# Patient Record
Sex: FEMALE | Race: WHITE | NOT HISPANIC OR LATINO
[De-identification: names, ages, dates, MRNs, and addresses within clinical notes are randomized per-mention and may not be internally consistent; named-entity substitution may affect disease eponyms.]

---

## 2017-07-20 PROBLEM — Z00.00 ENCOUNTER FOR PREVENTIVE HEALTH EXAMINATION: Status: ACTIVE | Noted: 2017-07-20

## 2017-07-27 ENCOUNTER — APPOINTMENT (OUTPATIENT)
Dept: ORTHOPEDIC SURGERY | Facility: CLINIC | Age: 27
End: 2017-07-27
Payer: COMMERCIAL

## 2017-07-27 VITALS — WEIGHT: 158 LBS | HEIGHT: 65 IN | BODY MASS INDEX: 26.33 KG/M2

## 2017-07-27 DIAGNOSIS — M89.9 DISORDER OF BONE, UNSPECIFIED: ICD-10-CM

## 2017-07-27 DIAGNOSIS — Z56.0 UNEMPLOYMENT, UNSPECIFIED: ICD-10-CM

## 2017-07-27 DIAGNOSIS — Z78.9 OTHER SPECIFIED HEALTH STATUS: ICD-10-CM

## 2017-07-27 PROCEDURE — 99204 OFFICE O/P NEW MOD 45 MIN: CPT

## 2017-07-27 SDOH — ECONOMIC STABILITY - INCOME SECURITY: UNEMPLOYMENT, UNSPECIFIED: Z56.0

## 2017-08-11 ENCOUNTER — APPOINTMENT (OUTPATIENT)
Dept: ORTHOPEDIC SURGERY | Facility: HOSPITAL | Age: 27
End: 2017-08-11

## 2017-08-18 ENCOUNTER — OUTPATIENT (OUTPATIENT)
Dept: OUTPATIENT SERVICES | Facility: HOSPITAL | Age: 27
LOS: 1 days | End: 2017-08-18

## 2017-08-18 VITALS
SYSTOLIC BLOOD PRESSURE: 118 MMHG | WEIGHT: 160.06 LBS | RESPIRATION RATE: 14 BRPM | DIASTOLIC BLOOD PRESSURE: 70 MMHG | HEART RATE: 60 BPM | HEIGHT: 64 IN | TEMPERATURE: 98 F

## 2017-08-18 DIAGNOSIS — Z98.890 OTHER SPECIFIED POSTPROCEDURAL STATES: Chronic | ICD-10-CM

## 2017-08-18 DIAGNOSIS — M25.9 JOINT DISORDER, UNSPECIFIED: ICD-10-CM

## 2017-08-18 DIAGNOSIS — M89.9 DISORDER OF BONE, UNSPECIFIED: ICD-10-CM

## 2017-08-18 DIAGNOSIS — Z01.818 ENCOUNTER FOR OTHER PREPROCEDURAL EXAMINATION: ICD-10-CM

## 2017-08-18 DIAGNOSIS — Z90.79 ACQUIRED ABSENCE OF OTHER GENITAL ORGAN(S): Chronic | ICD-10-CM

## 2017-08-18 LAB
HCG SERPL-ACNC: < 5 MIU/ML — SIGNIFICANT CHANGE UP
HCT VFR BLD CALC: 38.8 % — SIGNIFICANT CHANGE UP (ref 34.5–45)
HGB BLD-MCNC: 12.7 G/DL — SIGNIFICANT CHANGE UP (ref 11.5–15.5)
MCHC RBC-ENTMCNC: 28.5 PG — SIGNIFICANT CHANGE UP (ref 27–34)
MCHC RBC-ENTMCNC: 32.7 % — SIGNIFICANT CHANGE UP (ref 32–36)
MCV RBC AUTO: 87 FL — SIGNIFICANT CHANGE UP (ref 80–100)
NRBC # FLD: 0 — SIGNIFICANT CHANGE UP
PLATELET # BLD AUTO: 316 K/UL — SIGNIFICANT CHANGE UP (ref 150–400)
PMV BLD: 9.5 FL — SIGNIFICANT CHANGE UP (ref 7–13)
RBC # BLD: 4.46 M/UL — SIGNIFICANT CHANGE UP (ref 3.8–5.2)
RBC # FLD: 13.1 % — SIGNIFICANT CHANGE UP (ref 10.3–14.5)
WBC # BLD: 7.42 K/UL — SIGNIFICANT CHANGE UP (ref 3.8–10.5)
WBC # FLD AUTO: 7.42 K/UL — SIGNIFICANT CHANGE UP (ref 3.8–10.5)

## 2017-08-18 RX ORDER — SODIUM CHLORIDE 9 MG/ML
3 INJECTION INTRAMUSCULAR; INTRAVENOUS; SUBCUTANEOUS EVERY 8 HOURS
Qty: 0 | Refills: 0 | Status: DISCONTINUED | OUTPATIENT
Start: 2017-08-30 | End: 2017-09-14

## 2017-08-18 RX ORDER — SODIUM CHLORIDE 9 MG/ML
1000 INJECTION, SOLUTION INTRAVENOUS
Qty: 0 | Refills: 0 | Status: DISCONTINUED | OUTPATIENT
Start: 2017-08-30 | End: 2017-09-14

## 2017-08-18 NOTE — H&P PST ADULT - LYMPHATIC
anterior cervical L/supraclavicular L/supraclavicular R/posterior cervical L/anterior cervical R/posterior cervical R

## 2017-08-18 NOTE — H&P PST ADULT - GENERAL COMMENTS
treated with amoxicillin for tooth infection x 21 days, course completed. treated with amoxicillin for tooth infection x 21 days 2 months ago, course completed.

## 2017-08-18 NOTE — H&P PST ADULT - PROBLEM SELECTOR PLAN 1
Scheduled for Left Biopsy Possible Curettage Graft Cement of left Distal Radius Lesion on 8/30/2017.   Pre op instructions given, pt verbalized understanding   Chlorhexidine wash and GI prophylaxis provided

## 2017-08-18 NOTE — H&P PST ADULT - HISTORY OF PRESENT ILLNESS
25 y/o female presents to [facility] for preoperative evaluation with diagnosis of disorder of bone and joint. H/O Left wrist pain x 2 months. Pt states MRI of left wrist was performed which noted "possible osteosarcoma". She is scheduled for Left Biopsy Possible Curettage Graft Cement of left Distal Radius Lesion on 8/30/2017. She reports intermittent stabbing pain and decreased ROM. 27 y/o female presents to [facility] for preoperative evaluation with diagnosis of disorder of bone and joint. H/O Left wrist pain x 2 months. Pt states MRI of left wrist was performed which noted "possible osteosarcoma". She is scheduled for Left Biopsy Possible Curettage Graft Cement of left Distal Radius Lesion on 8/30/2017. She reports intermittent stabbing pain and decreased ROM of the left wrsit.

## 2017-08-18 NOTE — H&P PST ADULT - RS GEN PE MLT RESP DETAILS PC
no chest wall tenderness/breath sounds equal/clear to auscultation bilaterally/airway patent/respirations non-labored/no wheezes/good air movement

## 2017-08-18 NOTE — H&P PST ADULT - NEGATIVE GENERAL GENITOURINARY SYMPTOMS
no renal colic/no flank pain R/no bladder infections/no dysuria/no hematuria/no incontinence/no flank pain L

## 2017-08-18 NOTE — H&P PST ADULT - NEGATIVE ENMT SYMPTOMS
no gum bleeding/no throat pain/no sinus symptoms/no recurrent cold sores/no post-nasal discharge/no dysphagia/no hearing difficulty/no nasal congestion

## 2017-08-18 NOTE — H&P PST ADULT - NEGATIVE CARDIOVASCULAR SYMPTOMS
no chest pain/no orthopnea/no peripheral edema/no paroxysmal nocturnal dyspnea/no palpitations/no dyspnea on exertion/no claudication

## 2017-08-18 NOTE — H&P PST ADULT - NSANTHOSAYNRD_GEN_A_CORE
No. GIRISH screening performed.  STOP BANG Legend: 0-2 = LOW Risk; 3-4 = INTERMEDIATE Risk; 5-8 = HIGH Risk

## 2017-08-18 NOTE — H&P PST ADULT - PMH
Anxiety and depression    Arthritis  left knee  Endometriosis    Herniated lumbar intervertebral disc    PCOS (polycystic ovarian syndrome) Anxiety and depression    Arthritis  left knee  Disorder of bone    Endometriosis    Herniated lumbar intervertebral disc    Joint disorder    PCOS (polycystic ovarian syndrome)

## 2017-08-18 NOTE — H&P PST ADULT - ATTENDING COMMENTS
as written above   for biopsy, possible resection of left distal radius lesion  Risks, benefits and alternatives discussed with patient.  Norm Varma MD  Musculoskeletal Oncology  [phone]

## 2017-08-30 ENCOUNTER — APPOINTMENT (OUTPATIENT)
Dept: ORTHOPEDIC SURGERY | Facility: HOSPITAL | Age: 27
End: 2017-08-30

## 2017-08-30 ENCOUNTER — OUTPATIENT (OUTPATIENT)
Dept: OUTPATIENT SERVICES | Facility: HOSPITAL | Age: 27
LOS: 1 days | Discharge: HOME | End: 2017-08-30
Payer: MEDICAID

## 2017-08-30 ENCOUNTER — RESULT REVIEW (OUTPATIENT)
Age: 27
End: 2017-08-30

## 2017-08-30 VITALS
OXYGEN SATURATION: 98 % | HEART RATE: 76 BPM | SYSTOLIC BLOOD PRESSURE: 101 MMHG | DIASTOLIC BLOOD PRESSURE: 70 MMHG | RESPIRATION RATE: 16 BRPM

## 2017-08-30 VITALS
HEART RATE: 86 BPM | RESPIRATION RATE: 16 BRPM | OXYGEN SATURATION: 100 % | WEIGHT: 160.06 LBS | HEIGHT: 64 IN | SYSTOLIC BLOOD PRESSURE: 104 MMHG | TEMPERATURE: 98 F | DIASTOLIC BLOOD PRESSURE: 70 MMHG

## 2017-08-30 DIAGNOSIS — M25.9 JOINT DISORDER, UNSPECIFIED: ICD-10-CM

## 2017-08-30 DIAGNOSIS — Z90.79 ACQUIRED ABSENCE OF OTHER GENITAL ORGAN(S): Chronic | ICD-10-CM

## 2017-08-30 DIAGNOSIS — Z98.890 OTHER SPECIFIED POSTPROCEDURAL STATES: Chronic | ICD-10-CM

## 2017-08-30 LAB
GRAM STN WND: SIGNIFICANT CHANGE UP
HCG UR QL: NEGATIVE — SIGNIFICANT CHANGE UP
SPECIMEN SOURCE: SIGNIFICANT CHANGE UP

## 2017-08-30 PROCEDURE — 25126 REMOVE/GRAFT FOREARM LESION: CPT

## 2017-08-30 PROCEDURE — 76000 FLUOROSCOPY <1 HR PHYS/QHP: CPT | Mod: 26

## 2017-08-30 PROCEDURE — 88309 TISSUE EXAM BY PATHOLOGIST: CPT | Mod: 26

## 2017-08-30 PROCEDURE — 25035 INC DP BONE CRTX F/ARM&/WRST: CPT

## 2017-08-30 PROCEDURE — 88305 TISSUE EXAM BY PATHOLOGIST: CPT | Mod: 26

## 2017-08-30 RX ORDER — IBUPROFEN 200 MG
1 TABLET ORAL
Qty: 0 | Refills: 0 | COMMUNITY

## 2017-08-30 RX ORDER — FENTANYL CITRATE 50 UG/ML
25 INJECTION INTRAVENOUS
Qty: 0 | Refills: 0 | Status: DISCONTINUED | OUTPATIENT
Start: 2017-08-30 | End: 2017-08-30

## 2017-08-30 RX ORDER — ONDANSETRON 8 MG/1
4 TABLET, FILM COATED ORAL ONCE
Qty: 0 | Refills: 0 | Status: COMPLETED | OUTPATIENT
Start: 2017-08-30 | End: 2017-08-30

## 2017-08-30 RX ORDER — SODIUM CHLORIDE 9 MG/ML
1000 INJECTION, SOLUTION INTRAVENOUS
Qty: 0 | Refills: 0 | Status: DISCONTINUED | OUTPATIENT
Start: 2017-08-30 | End: 2017-09-14

## 2017-08-30 RX ORDER — FENTANYL CITRATE 50 UG/ML
50 INJECTION INTRAVENOUS
Qty: 0 | Refills: 0 | Status: DISCONTINUED | OUTPATIENT
Start: 2017-08-30 | End: 2017-08-30

## 2017-08-30 RX ORDER — OXYCODONE HYDROCHLORIDE 5 MG/1
5 TABLET ORAL ONCE
Qty: 0 | Refills: 0 | Status: DISCONTINUED | OUTPATIENT
Start: 2017-08-30 | End: 2017-08-30

## 2017-08-30 RX ORDER — ALPRAZOLAM 0.25 MG
1 TABLET ORAL
Qty: 0 | Refills: 0 | COMMUNITY

## 2017-08-30 RX ORDER — NORETHINDRONE AND ETHINYL ESTRADIOL 0.4-0.035
1 KIT ORAL
Qty: 0 | Refills: 0 | COMMUNITY

## 2017-08-30 RX ORDER — SERTRALINE 25 MG/1
150 TABLET, FILM COATED ORAL
Qty: 0 | Refills: 0 | COMMUNITY

## 2017-08-30 RX ORDER — OXYCODONE HYDROCHLORIDE 5 MG/1
1 TABLET ORAL
Qty: 30 | Refills: 0 | OUTPATIENT
Start: 2017-08-30

## 2017-08-30 RX ADMIN — FENTANYL CITRATE 50 MICROGRAM(S): 50 INJECTION INTRAVENOUS at 16:37

## 2017-08-30 RX ADMIN — ONDANSETRON 4 MILLIGRAM(S): 8 TABLET, FILM COATED ORAL at 15:45

## 2017-08-30 RX ADMIN — SODIUM CHLORIDE 75 MILLILITER(S): 9 INJECTION, SOLUTION INTRAVENOUS at 16:04

## 2017-08-30 RX ADMIN — SODIUM CHLORIDE 30 MILLILITER(S): 9 INJECTION, SOLUTION INTRAVENOUS at 11:20

## 2017-08-30 RX ADMIN — FENTANYL CITRATE 50 MICROGRAM(S): 50 INJECTION INTRAVENOUS at 16:10

## 2017-08-30 RX ADMIN — FENTANYL CITRATE 50 MICROGRAM(S): 50 INJECTION INTRAVENOUS at 16:25

## 2017-08-30 RX ADMIN — FENTANYL CITRATE 50 MICROGRAM(S): 50 INJECTION INTRAVENOUS at 16:51

## 2017-08-30 NOTE — ASU DISCHARGE PLAN (ADULT/PEDIATRIC). - YOU WERE IN THE HOSPITAL FOR:
excision, curretage of left distal radius lesion excision, curretage and bone grafting of left distal radius lesion

## 2017-08-30 NOTE — ASU DISCHARGE PLAN (ADULT/PEDIATRIC). - MEDICATION SUMMARY - MEDICATIONS TO TAKE
I will START or STAY ON the medications listed below when I get home from the hospital:    oxyCODONE 5 mg oral tablet  -- 1 tab(s) by mouth every 4 hours MDD:6 tabs  -- Caution federal law prohibits the transfer of this drug to any person other  than the person for whom it was prescribed.  It is very important that you take or use this exactly as directed.  Do not skip doses or discontinue unless directed by your doctor.  May cause drowsiness.  Alcohol may intensify this effect.  Use care when operating dangerous machinery.  This prescription cannot be refilled.  Using more of this medication than prescribed may cause serious breathing problems.    -- Indication: For pain    Zoloft  -- 150 milligram(s) by mouth once a day in pm  -- Indication: For home med    Xanax 0.5 mg oral tablet  -- 1 tab(s) by mouth 3 times a day, As Needed  -- Indication: For home med    Lo Loestrin Fe 1 mg-10 mcg oral tablet  -- 1 tab(s) by mouth once a day  -- Indication: For home med

## 2017-08-30 NOTE — ASU PATIENT PROFILE, ADULT - PMH
Anxiety and depression    Arthritis  left knee  Disorder of bone    Endometriosis    Herniated lumbar intervertebral disc    Joint disorder    PCOS (polycystic ovarian syndrome)

## 2017-08-30 NOTE — ASU DISCHARGE PLAN (ADULT/PEDIATRIC). - NOTIFY
Fever greater than 101/Numbness, color, or temperature change to extremity/Bleeding that does not stop/Pain not relieved by Medications/Swelling that continues Swelling that continues/Persistent Nausea and Vomiting/Bleeding that does not stop/Numbness, color, or temperature change to extremity/Pain not relieved by Medications/Fever greater than 101

## 2017-08-31 ENCOUNTER — TRANSCRIPTION ENCOUNTER (OUTPATIENT)
Age: 27
End: 2017-08-31

## 2017-09-05 LAB — CULTURE - SURGICAL SITE: SIGNIFICANT CHANGE UP

## 2017-09-11 ENCOUNTER — APPOINTMENT (OUTPATIENT)
Dept: ORTHOPEDIC SURGERY | Facility: CLINIC | Age: 27
End: 2017-09-11
Payer: MEDICAID

## 2017-09-11 PROCEDURE — 99024 POSTOP FOLLOW-UP VISIT: CPT

## 2017-10-17 ENCOUNTER — APPOINTMENT (OUTPATIENT)
Dept: ORTHOPEDIC SURGERY | Facility: CLINIC | Age: 27
End: 2017-10-17

## 2018-05-22 ENCOUNTER — APPOINTMENT (OUTPATIENT)
Dept: ORTHOPEDIC SURGERY | Facility: CLINIC | Age: 28
End: 2018-05-22
Payer: MEDICAID

## 2018-05-22 PROCEDURE — 73110 X-RAY EXAM OF WRIST: CPT | Mod: LT

## 2018-05-22 PROCEDURE — 99214 OFFICE O/P EST MOD 30 MIN: CPT

## 2018-07-16 PROBLEM — M19.90 UNSPECIFIED OSTEOARTHRITIS, UNSPECIFIED SITE: Chronic | Status: ACTIVE | Noted: 2017-08-18

## 2018-07-30 NOTE — ASU PREOP CHECKLIST - PATIENT SENT TO
1) PCP Contacted on Admission: (Y/N) --> Name & Phone #:  2) Date of Contact with PCP:  3) PCP Contacted at Discharge: (Y/N)  4) Summary of Handoff Given to PCP:   5) Post-Discharge Appointment Date and Location: operating room

## 2018-08-13 ENCOUNTER — APPOINTMENT (OUTPATIENT)
Dept: ORTHOPEDIC SURGERY | Facility: CLINIC | Age: 28
End: 2018-08-13

## 2018-12-11 PROBLEM — N80.9 ENDOMETRIOSIS, UNSPECIFIED: Chronic | Status: ACTIVE | Noted: 2017-08-18

## 2018-12-11 PROBLEM — F41.9 ANXIETY DISORDER, UNSPECIFIED: Chronic | Status: ACTIVE | Noted: 2017-08-18

## 2018-12-11 PROBLEM — M25.9 JOINT DISORDER, UNSPECIFIED: Chronic | Status: ACTIVE | Noted: 2017-08-18

## 2018-12-11 PROBLEM — M51.26 OTHER INTERVERTEBRAL DISC DISPLACEMENT, LUMBAR REGION: Chronic | Status: ACTIVE | Noted: 2017-08-18

## 2018-12-11 PROBLEM — M89.9 DISORDER OF BONE, UNSPECIFIED: Chronic | Status: ACTIVE | Noted: 2017-08-18

## 2018-12-11 PROBLEM — E28.2 POLYCYSTIC OVARIAN SYNDROME: Chronic | Status: ACTIVE | Noted: 2017-08-18

## 2019-01-15 ENCOUNTER — APPOINTMENT (OUTPATIENT)
Dept: ORTHOPEDIC SURGERY | Facility: CLINIC | Age: 29
End: 2019-01-15

## 2019-02-15 ENCOUNTER — APPOINTMENT (OUTPATIENT)
Dept: ORTHOPEDIC SURGERY | Facility: CLINIC | Age: 29
End: 2019-02-15
Payer: MEDICAID

## 2019-02-15 VITALS — WEIGHT: 140 LBS | HEIGHT: 65 IN | BODY MASS INDEX: 23.32 KG/M2

## 2019-02-15 DIAGNOSIS — M25.9 JOINT DISORDER, UNSPECIFIED: ICD-10-CM

## 2019-02-15 PROCEDURE — 73110 X-RAY EXAM OF WRIST: CPT | Mod: 50

## 2019-02-15 PROCEDURE — 99213 OFFICE O/P EST LOW 20 MIN: CPT

## 2019-02-21 NOTE — REVIEW OF SYSTEMS
[Chills] : no chills [Feeling Tired] : not feeling tired [Fever] : no fever [Recent Wt Gain ___ (lbs)] : no recent weight gain [Joint Pain] : no joint pain [Nl] : Hematologic/Lymphatic

## 2019-02-21 NOTE — DISCUSSION/SUMMARY
[All Questions Answered] : Patient (and family) had all questions answered to an agreeable level of satisfaction [Interested in Proceeding] : Patient (and family) expressed understanding and interest in proceeding with the plan as outlined [de-identified] : Patient de Quervain's on the RIGHT side which I discussed anti-inflammatory as well as followup with hand surgeon.\par \par With regard to LEFT wrist until tumor I am worried that there could be a recurrence or nonhealing of the graft. She is having minimal pain in the area however it is important to follow this up as there is no lucency. Followup with MRI of the LEFT distal radius.\par \par If imaging was ordered, the patient was told to make an appointment to review findings right after all imaging is completed.\par \par We discussed risks, benefits and alternatives. Rationale of care was reviewed and all questions were answered.

## 2019-02-21 NOTE — DATA REVIEWED
[Imaging Present] : Present [de-identified] : X-rays today 3 views of bilateral wrists show no problems in the RIGHT wrist. In the area of the surgery of the LEFT wrist there isn't a new area of lucency in the midportion of the bone. This could be related to a residual giant cell tumor or could be lucency where bone graft did not fill in. There is also questionable small lucency at the more proximal ulnar portion in the area away from on the other side of the resection.

## 2019-02-21 NOTE — HISTORY OF PRESENT ILLNESS
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 She complains of occasional pain. She does not have much disability on the LEFT side but now complains of some mild RIGHT wrist pain her joints make sure that there is no tumor on the RIGHT side. She does not have an excuse as to why she did not followup since surgery. [Stable] : stable [1] : currently ~his/her~ pain is 1 out of 10 [Direct Pressure] : worsened by direct pressure

## 2019-02-21 NOTE — REASON FOR VISIT
[FreeTextEntry1] :  8/30/2017 - Left distal radius bx, curretage and bone graft. mild bilateral wrist pain.

## 2019-02-21 NOTE — PHYSICAL EXAM
[FreeTextEntry1] : On exam patient is minimal tenderness in the distal radius. She is able to move around with bilateral wrist extension to 75° and flexion to 80°. She has normal pronation and supination. His mild pain along the 1st dorsal compartment on the RIGHT wrist consistent with DeQuervain's syndrome. She also has pain with positive Finkelstein testing.\par She is minimal tenderness over the LEFT wrist where there is a well-healed scar. She has no numbness anywhere. She has no lymphadenopathy at the epitrochlear or axillary area. [General Appearance - Well-Appearing] : Well appearing [General Appearance - Well Nourished] : well nourished [Oriented To Time, Place, And Person] : Oriented to person, place, and time [Impaired Insight] : Insight and judgment were intact [Affect] : The affect was normal. [Mood] : the mood was normal [Sclera] : the sclera and conjunctiva were normal [Neck Cervical Mass (___cm)] : no neck mass was observed [Heart Rate And Rhythm] : heart rate was normal and rhythm regular [] : No respiratory distress [Abdomen Soft] : Soft [Normal Station and Gait] : gait and station were normal [Swelling] : no swelling [Tenderness] : tenderness [Skin Changes - Describe changes:] : No skin changes noted [Incision Healed - Describe:] : Incision is healed ~M [Full UE ROM unless otherwise noted:] : Full range of motion unless otherwise noted. [Normal] : Sensation intact to light touch. [2+] : left 2+

## 2019-03-01 ENCOUNTER — APPOINTMENT (OUTPATIENT)
Dept: ORTHOPEDIC SURGERY | Facility: CLINIC | Age: 29
End: 2019-03-01

## 2019-03-05 ENCOUNTER — APPOINTMENT (OUTPATIENT)
Dept: ORTHOPEDIC SURGERY | Facility: CLINIC | Age: 29
End: 2019-03-05
Payer: MEDICAID

## 2019-03-05 DIAGNOSIS — D48.0 NEOPLASM OF UNCERTAIN BEHAVIOR OF BONE AND ARTICULAR CARTILAGE: ICD-10-CM

## 2019-03-05 PROCEDURE — 99214 OFFICE O/P EST MOD 30 MIN: CPT

## 2019-03-07 PROBLEM — D48.0 GIANT CELL TUMOR OF BONE: Status: ACTIVE | Noted: 2017-09-11

## 2019-03-15 NOTE — HISTORY OF PRESENT ILLNESS
[FreeTextEntry1] : Patient is now back to review her MRI. She is not having pain in the RIGHT side but does occasionally have pain in the LEFT. She has no new masses or bumps. [Stable] : stable [1] : currently ~his/her~ pain is 1 out of 10

## 2019-03-15 NOTE — DATA REVIEWED
[Imaging Present] : Present [de-identified] : MRI from 3/5/2019 shows 2 focal enhancing bone lesion suggesting recurrent tumor largest one is at the ulnar aspect of the radial head 1.4 x 1.5 the other one is more proximal about 1 cm. This is exactly as was appearing on the xray

## 2019-03-15 NOTE — DISCUSSION/SUMMARY
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 It is very difficult to follow she did not followup on a normal basis however she certainly has these 2 lesions that are concerning. Because it was a young distal radius we bone grafted and so cemented in the certainly could have a reason for her to recur. We discussed in a cement therapy however I think it would be appropriate to do surgery for her. We discussed doing a new dorsal vs. a new volar incision. I think a volar incision would be more appropriate based on where the lesions are. However a forced we can do dorsally still. I will see her again when she is ready for surgery.\par We discussed the importance of doing this as this is an aggressive lesion it has already recurred. It should not be left alone because it can destroy the joint.\par \par If imaging was ordered, the patient was told to make an appointment to review findings right after all imaging is completed.\par \par We discussed risks, benefits and alternatives. Rationale of care was reviewed and all questions were answered. Patient (and family) had all questions answered to her degree of the level of satisfaction. Patient (and family) expressed understanding and interest in proceeding with the plan as outlined.\par \par \par \par \par This note was done with a voice recognition transcription software and any typos are related to this rather than medical error.

## 2019-04-02 ENCOUNTER — APPOINTMENT (OUTPATIENT)
Dept: ORTHOPEDIC SURGERY | Facility: CLINIC | Age: 29
End: 2019-04-02
Payer: MEDICAID

## 2019-04-02 PROCEDURE — XXXXX: CPT

## 2019-06-18 ENCOUNTER — APPOINTMENT (OUTPATIENT)
Dept: ORTHOPEDIC SURGERY | Facility: CLINIC | Age: 29
End: 2019-06-18

## 2019-12-26 ENCOUNTER — HOSPITAL ENCOUNTER (EMERGENCY)
Facility: HOSPITAL | Age: 29
Discharge: HOME | End: 2019-12-26
Payer: COMMERCIAL

## 2019-12-26 VITALS
OXYGEN SATURATION: 97 % | HEART RATE: 106 BPM | SYSTOLIC BLOOD PRESSURE: 113 MMHG | BODY MASS INDEX: 30.04 KG/M2 | WEIGHT: 175.93 LBS | DIASTOLIC BLOOD PRESSURE: 75 MMHG | HEIGHT: 64 IN | TEMPERATURE: 99.5 F | RESPIRATION RATE: 18 BRPM

## 2019-12-26 DIAGNOSIS — J03.90 EXUDATIVE TONSILLITIS: ICD-10-CM

## 2019-12-26 DIAGNOSIS — J01.10 ACUTE FRONTAL SINUSITIS: Primary | ICD-10-CM

## 2019-12-26 PROCEDURE — 99283 EMERGENCY DEPT VISIT LOW MDM: CPT | Performed by: PHYSICIAN ASSISTANT

## 2019-12-26 PROCEDURE — 99283 EMERGENCY DEPT VISIT LOW MDM: CPT

## 2019-12-26 RX ORDER — AMOXICILLIN AND CLAVULANATE POTASSIUM 875; 125 MG/1; MG/1
1 TABLET, FILM COATED ORAL EVERY 12 HOURS SCHEDULED
Qty: 20 TABLET | Refills: 0 | Status: SHIPPED | OUTPATIENT
Start: 2019-12-26 | End: 2020-01-05

## 2019-12-26 NOTE — ED PROVIDER NOTES
History  Chief Complaint   Patient presents with    Flu Symptoms     Patient presents to the ED with body aches and a dry cough x1 week  States she is currently staying with someone who has the flu  Poor appetite as well  77-year-old female comes in today complaining of cold-like symptoms for the past 1 week  She reports ear pain, nasal congestion, sore throat, intermittent fevers  She reports recent sick contacts  URI   Presenting symptoms: congestion, ear pain, rhinorrhea and sore throat    Severity:  Moderate  Onset quality:  Gradual  Duration:  1 week  Timing:  Constant  Progression:  Unchanged  Chronicity:  New  Relieved by:  Nothing  Worsened by:  Nothing  Ineffective treatments:  None tried  Associated symptoms: sinus pain    Associated symptoms: no headaches    Risk factors: sick contacts        Prior to Admission Medications   Prescriptions Last Dose Informant Patient Reported? Taking? ALPRAZolam (XANAX) 0 5 mg tablet   Yes No   Sig: Take 0 5 mg by mouth 3 (three) times a day as needed for anxiety   dicyclomine (BENTYL) 20 mg tablet   No No   Sig: Take 1 tablet by mouth 2 (two) times a day   famotidine (PEPCID) 20 mg tablet   No No   Sig: Take 1 tablet by mouth 2 (two) times a day   ibuprofen (MOTRIN) 600 mg tablet   No No   Sig: Take 1 tablet by mouth every 6 (six) hours as needed for mild pain for up to 10 days   sertraline (ZOLOFT) 100 mg tablet   Yes No   Sig: Take 100 mg by mouth daily      Facility-Administered Medications: None       Past Medical History:   Diagnosis Date    Anxiety     Polycystic ovary        Past Surgical History:   Procedure Laterality Date    OVARIAN CYST SURGERY      TUBE THORACOTOMY         History reviewed  No pertinent family history  I have reviewed and agree with the history as documented  Social History     Tobacco Use    Smoking status: Former Smoker    Smokeless tobacco: Never Used   Substance Use Topics    Alcohol use: No    Drug use:  No Review of Systems   Constitutional: Negative for activity change  HENT: Positive for congestion, ear pain, rhinorrhea, sinus pain and sore throat  Eyes: Negative for visual disturbance  Respiratory: Negative for shortness of breath  Cardiovascular: Negative for chest pain  Musculoskeletal: Negative for back pain  Skin: Negative for color change  Neurological: Negative for dizziness and headaches  Psychiatric/Behavioral: Negative for behavioral problems  Physical Exam  Physical Exam   Constitutional: She is oriented to person, place, and time  She appears well-developed and well-nourished  No distress  HENT:   Head: Normocephalic and atraumatic  Right Ear: Hearing and ear canal normal  A middle ear effusion is present  Left Ear: Hearing, tympanic membrane and ear canal normal    Nose: Mucosal edema and rhinorrhea present  Right sinus exhibits maxillary sinus tenderness and frontal sinus tenderness  Left sinus exhibits maxillary sinus tenderness and frontal sinus tenderness  Mouth/Throat: Uvula is midline  Posterior oropharyngeal erythema present  No posterior oropharyngeal edema or tonsillar abscesses  Tonsils are 3+ on the right  Tonsils are 3+ on the left  Tonsillar exudate  Eyes: Conjunctivae and EOM are normal    Pulmonary/Chest: Effort normal  No respiratory distress  Musculoskeletal: Normal range of motion  Neurological: She is alert and oriented to person, place, and time  Skin: Skin is warm and dry  No rash noted  Psychiatric: She has a normal mood and affect   Her behavior is normal        Vital Signs  ED Triage Vitals [12/26/19 1349]   Temperature Pulse Respirations Blood Pressure SpO2   99 5 °F (37 5 °C) (!) 106 18 113/75 97 %      Temp Source Heart Rate Source Patient Position - Orthostatic VS BP Location FiO2 (%)   Oral Monitor Sitting Left arm --      Pain Score       Worst Possible Pain           Vitals:    12/26/19 1349   BP: 113/75   Pulse: (!) 106   Patient Position - Orthostatic VS: Sitting         Visual Acuity      ED Medications  Medications - No data to display    Diagnostic Studies  Results Reviewed     None                 No orders to display              Procedures  Procedures         ED Course                               MDM      Disposition  Final diagnoses:   Acute frontal sinusitis   Exudative tonsillitis     Time reflects when diagnosis was documented in both MDM as applicable and the Disposition within this note     Time User Action Codes Description Comment    12/26/2019  2:43 PM Myna Paula Add [J01 10] Acute frontal sinusitis     12/26/2019  2:44 PM Myna Paula Add [J03 90] Exudative tonsillitis       ED Disposition     ED Disposition Condition Date/Time Comment    Discharge Stable Thu Dec 26, 2019  2:43 PM [address] discharge to home/self care  Follow-up Information     Follow up With Specialties Details Why Contact Info Additional [address] [facility] Schedule an appointment as soon as possible for a visit   [address] [facility] [address], [address] [address] [address], [address] [place] [address]          Patient's Medications   Discharge Prescriptions    AMOXICILLIN-CLAVULANATE (AUGMENTIN) 875-125 MG PER TABLET    Take 1 tablet by mouth every 12 (twelve) hours for 10 days       Start Date: 12/26/2019End Date: 1/5/2020       Order Dose: 1 tablet       Quantity: 20 tablet    Refills: 0     No discharge procedures on file      ED Provider  Electronically Signed by           Boby Knox PA-C  12/26/19 [id number]

## 2023-07-31 NOTE — PHYSICAL EXAM
[FreeTextEntry1] : Exam is unchanged from before. She still has the same dorsal scar and minimal tenderness throughout. She is able to move her wrist otherwise. She is still neurovascularly intact. [Tenderness] : tenderness [Swelling] : no swelling [Skin Changes - Describe changes:] : No skin changes noted [Incision Healed - Describe:] : Incision is healed ~M [Full UE ROM unless otherwise noted:] : Full range of motion unless otherwise noted. [Normal] : Sensation intact to light touch. [2+] : left 2+ Patient/Caregiver provided printed discharge information.